# Patient Record
Sex: MALE | Race: OTHER
[De-identification: names, ages, dates, MRNs, and addresses within clinical notes are randomized per-mention and may not be internally consistent; named-entity substitution may affect disease eponyms.]

---

## 2021-07-05 ENCOUNTER — HOSPITAL ENCOUNTER (INPATIENT)
Dept: HOSPITAL 54 - TELE1 | Age: 26
LOS: 4 days | Discharge: HOME | DRG: 137 | End: 2021-07-09
Attending: NURSE PRACTITIONER | Admitting: NURSE PRACTITIONER
Payer: MEDICAID

## 2021-07-05 VITALS — DIASTOLIC BLOOD PRESSURE: 65 MMHG | SYSTOLIC BLOOD PRESSURE: 102 MMHG

## 2021-07-05 VITALS — DIASTOLIC BLOOD PRESSURE: 68 MMHG | SYSTOLIC BLOOD PRESSURE: 109 MMHG

## 2021-07-05 VITALS — DIASTOLIC BLOOD PRESSURE: 65 MMHG | SYSTOLIC BLOOD PRESSURE: 105 MMHG

## 2021-07-05 VITALS — SYSTOLIC BLOOD PRESSURE: 107 MMHG | DIASTOLIC BLOOD PRESSURE: 71 MMHG

## 2021-07-05 VITALS — WEIGHT: 164 LBS | BODY MASS INDEX: 27.32 KG/M2 | HEIGHT: 65 IN

## 2021-07-05 VITALS — SYSTOLIC BLOOD PRESSURE: 106 MMHG | DIASTOLIC BLOOD PRESSURE: 65 MMHG

## 2021-07-05 VITALS — DIASTOLIC BLOOD PRESSURE: 60 MMHG | SYSTOLIC BLOOD PRESSURE: 106 MMHG

## 2021-07-05 DIAGNOSIS — E87.2: ICD-10-CM

## 2021-07-05 DIAGNOSIS — E86.0: ICD-10-CM

## 2021-07-05 DIAGNOSIS — E88.09: ICD-10-CM

## 2021-07-05 DIAGNOSIS — R74.8: ICD-10-CM

## 2021-07-05 DIAGNOSIS — U07.1: Primary | ICD-10-CM

## 2021-07-05 DIAGNOSIS — J12.82: ICD-10-CM
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
## 2021-07-05 DIAGNOSIS — T50.995A: ICD-10-CM

## 2021-07-05 DIAGNOSIS — J15.9: ICD-10-CM

## 2021-07-05 DIAGNOSIS — D72.810: ICD-10-CM

## 2021-07-05 LAB
ALBUMIN SERPL BCP-MCNC: 3.2 G/DL (ref 3.4–5)
ALP SERPL-CCNC: 22 U/L (ref 46–116)
ALT SERPL W P-5'-P-CCNC: 65 U/L (ref 12–78)
AST SERPL W P-5'-P-CCNC: 53 U/L (ref 15–37)
BASE EXCESS BLDA CALC-SCNC: -3.2 MMOL/L
BASOPHILS # BLD AUTO: 0 K/UL (ref 0–0.2)
BASOPHILS NFR BLD AUTO: 0.1 % (ref 0–2)
BILIRUB DIRECT SERPL-MCNC: 0.2 MG/DL (ref 0–0.2)
BILIRUB SERPL-MCNC: 0.3 MG/DL (ref 0.2–1)
BUN SERPL-MCNC: 11 MG/DL (ref 7–18)
CALCIUM SERPL-MCNC: 8.7 MG/DL (ref 8.5–10.1)
CHLORIDE SERPL-SCNC: 101 MMOL/L (ref 98–107)
CO2 SERPL-SCNC: 29 MMOL/L (ref 21–32)
CREAT SERPL-MCNC: 0.8 MG/DL (ref 0.6–1.3)
DO-HGB MFR BLDA: 76.2 MMHG
EOSINOPHIL NFR BLD AUTO: 0 % (ref 0–6)
FERRITIN SERPL-MCNC: 630 NG/ML (ref 8–388)
GLUCOSE SERPL-MCNC: 149 MG/DL (ref 74–106)
HCT VFR BLD AUTO: 42 % (ref 39–51)
HGB BLD-MCNC: 14 G/DL (ref 13.5–17.5)
INHALED O2 CONCENTRATION: 28 %
LYMPHOCYTES NFR BLD AUTO: 0.3 K/UL (ref 0.8–4.8)
LYMPHOCYTES NFR BLD AUTO: 11.7 % (ref 20–44)
MCHC RBC AUTO-ENTMCNC: 34 G/DL (ref 31–36)
MCV RBC AUTO: 91 FL (ref 80–96)
MONOCYTES NFR BLD AUTO: 0.2 K/UL (ref 0.1–1.3)
MONOCYTES NFR BLD AUTO: 5.7 % (ref 2–12)
NEUTROPHILS # BLD AUTO: 2.3 K/UL (ref 1.8–8.9)
NEUTROPHILS NFR BLD AUTO: 82.5 % (ref 43–81)
PCO2 TEMP ADJ BLDA: 27.9 MMHG (ref 35–45)
PH TEMP ADJ BLDA: 7.46 [PH] (ref 7.35–7.45)
PLATELET # BLD AUTO: 191 K/UL (ref 150–450)
PO2 TEMP ADJ BLDA: 90.5 MMHG (ref 75–100)
POTASSIUM SERPL-SCNC: 5 MMOL/L (ref 3.5–5.1)
PROT SERPL-MCNC: 7.9 G/DL (ref 6.4–8.2)
RBC # BLD AUTO: 4.59 MIL/UL (ref 4.5–6)
SAO2 % BLDA: 97.2 % (ref 92–98.5)
SODIUM SERPL-SCNC: 139 MMOL/L (ref 136–145)
WBC NRBC COR # BLD AUTO: 2.8 K/UL (ref 4.3–11)

## 2021-07-05 PROCEDURE — XW033E5 INTRODUCTION OF REMDESIVIR ANTI-INFECTIVE INTO PERIPHERAL VEIN, PERCUTANEOUS APPROACH, NEW TECHNOLOGY GROUP 5: ICD-10-PCS | Performed by: INTERNAL MEDICINE

## 2021-07-05 PROCEDURE — G0378 HOSPITAL OBSERVATION PER HR: HCPCS

## 2021-07-05 RX ADMIN — DEXAMETHASONE SODIUM PHOSPHATE SCH MG: 10 INJECTION INTRAMUSCULAR; INTRAVENOUS at 09:20

## 2021-07-05 RX ADMIN — ENOXAPARIN SODIUM SCH MG: 40 INJECTION SUBCUTANEOUS at 10:10

## 2021-07-05 RX ADMIN — PANTOPRAZOLE SODIUM SCH MG: 40 TABLET, DELAYED RELEASE ORAL at 08:26

## 2021-07-05 RX ADMIN — SODIUM CHLORIDE SCH MLS/HR: 9 INJECTION, SOLUTION INTRAVENOUS at 18:08

## 2021-07-05 NOTE — NUR
RN NOTE



PATIENT IS ALERT AND ORIENTED X4, ABLE TO MAKE NEEDS KNOWN. ON O2 2L VIA NASAL CANNULA, O2 
SAT 98%. NO SIGNS OF RESPIRATORY DISTRESS. DENIES ANY PAIN OR DISCOMFORT AT THIS TIME. TELE 
MONITOR ON, HR 70'S. IV ACCESS ON RIGHT AC #22, PATENT AND INTACT. ON DROPLET ISOLATION DUE 
TO POSITIVE COVID-19, PRECAUTIONS OBSERVED. BED LOCKED AND IN LOWEST POSITION. SAFETY 
MEASURES MAINTAINED. ALL NEEDS ANTICIPATED.

## 2021-07-05 NOTE — NUR
RN NOTE



PATIENT IS ALERT AND ORIENTED X4, ABLE TO MAKE NEEDS KNOWN. ON O2 2L VIA NASAL CANNULA, O2 
SAT 98%. NO SIGNS OF RESPIRATORY DISTRESS. DENIES ANY PAIN OR DISCOMFORT AT THIS TIME. TELE 
MONITOR ON, HR 72. IV ACCESS ON RIGHT AC #22, PATENT AND INTACT. BED LOCKED AND IN LOWEST 
POSITION. SAFETY MEASURES MAINTAINED. WILL ENDORSE TO AM SHIFT.

## 2021-07-05 NOTE — NUR
RN OPENING NOTE



RECEIVED PATIENT ASLEEP IN BED, EASY TO AROUSE. ALERT AND ORIENTED X4. ABLE TO MAKE NEEDS 
KNOWN. NO SIGNS OR SYMPTOMS OF DISTRESS NOTED. DENIES PAIN OR DISCOMFORT AT THIS TIME. ON O2 
2L NASAL CANNULA, TOLERATING WELL. IV ACCESS ON RAC#22, PATENT AND INTACT. SAFETY MEASURES 
IN PLACE WITH BED LOCKED AT LOWEST POSITION, SIDE RAILS UP X2. CALL LIGHT AND TABLE WITHIN 
REACH. WILL CONTINUE TO MONITOR PATIENT THROUGHOUT SHIFT.

## 2021-07-05 NOTE — NUR
ADMIT NOTE



DIRECT ADMIT FROM [facility], VIA GURNEY. PATIENT PLACED IN ROOM 103. PATIENT IN 
DROPLET ISOLATION DUE TO POSITIVE COVID-19 FROM [place]. ISOLATION PRECAUTIONS OBSERVED. 
PATIENT IS ALERT AND ORIENTED X4, ABLE TO MAKE NEEDS KNOWN. ON O2 2L VIA NASAL CANNULA, O2 
SAT 99%. NO SIGNS OF RESPIRATORY DISTRESS. DENIES ANY PAIN OR DISCOMFORT AT THIS TIME. 
PATIENT STATED BODY ACHES RELIEVED FROM TYLENOL TAKEN AT [place]. TELE MONITOR ON, HR 65. IV 
ACCESS ON RIGHT AC #22, PATENT AND INTACT. SKIN ASSESSMENT DONE, SKIN IS INTACT. VITAL SIGNS 
STABLE. ORIENTED PATIENT TO ROOM AND CALL LIGHT. BED LOCKED AND IN LOWEST POSITION. SAFETY 
MEASURES MAINTAINED. ALL NEEDS ANTICIPATED.

## 2021-07-05 NOTE — NUR
NOTIFIED ALISSA JOHNSTON, PATIENT RECEIVED DOSE OF ROCEPHIN AND AZITHROMYCIN @0555 AT [place] LAST 
NIGHT WITH NEW ORDERS. [facility] MADE AWARE.

## 2021-07-06 VITALS — DIASTOLIC BLOOD PRESSURE: 64 MMHG | SYSTOLIC BLOOD PRESSURE: 102 MMHG

## 2021-07-06 VITALS — DIASTOLIC BLOOD PRESSURE: 60 MMHG | SYSTOLIC BLOOD PRESSURE: 93 MMHG

## 2021-07-06 VITALS — SYSTOLIC BLOOD PRESSURE: 97 MMHG | DIASTOLIC BLOOD PRESSURE: 58 MMHG

## 2021-07-06 VITALS — SYSTOLIC BLOOD PRESSURE: 95 MMHG | DIASTOLIC BLOOD PRESSURE: 55 MMHG

## 2021-07-06 VITALS — SYSTOLIC BLOOD PRESSURE: 105 MMHG | DIASTOLIC BLOOD PRESSURE: 44 MMHG

## 2021-07-06 VITALS — DIASTOLIC BLOOD PRESSURE: 62 MMHG | SYSTOLIC BLOOD PRESSURE: 108 MMHG

## 2021-07-06 LAB
ALBUMIN SERPL BCP-MCNC: 3.4 G/DL (ref 3.4–5)
ALP SERPL-CCNC: 26 U/L (ref 46–116)
ALT SERPL W P-5'-P-CCNC: 93 U/L (ref 12–78)
AST SERPL W P-5'-P-CCNC: 54 U/L (ref 15–37)
BASOPHILS # BLD AUTO: 0 K/UL (ref 0–0.2)
BASOPHILS NFR BLD AUTO: 0.1 % (ref 0–2)
BILIRUB DIRECT SERPL-MCNC: 0.1 MG/DL (ref 0–0.2)
BILIRUB SERPL-MCNC: 0.3 MG/DL (ref 0.2–1)
BUN SERPL-MCNC: 13 MG/DL (ref 7–18)
CALCIUM SERPL-MCNC: 8.8 MG/DL (ref 8.5–10.1)
CHLORIDE SERPL-SCNC: 100 MMOL/L (ref 98–107)
CO2 SERPL-SCNC: 27 MMOL/L (ref 21–32)
CREAT SERPL-MCNC: 0.8 MG/DL (ref 0.6–1.3)
CRP SERPL-MCNC: 8.7 MG/DL (ref 0–0.9)
EOSINOPHIL NFR BLD AUTO: 0 % (ref 0–6)
GLUCOSE SERPL-MCNC: 112 MG/DL (ref 74–106)
HCT VFR BLD AUTO: 45 % (ref 39–51)
HGB BLD-MCNC: 14.9 G/DL (ref 13.5–17.5)
LYMPHOCYTES NFR BLD AUTO: 0.5 K/UL (ref 0.8–4.8)
LYMPHOCYTES NFR BLD AUTO: 7.2 % (ref 20–44)
MAGNESIUM SERPL-MCNC: 2.6 MG/DL (ref 1.8–2.4)
MCHC RBC AUTO-ENTMCNC: 33 G/DL (ref 31–36)
MCV RBC AUTO: 92 FL (ref 80–96)
MONOCYTES NFR BLD AUTO: 0.7 K/UL (ref 0.1–1.3)
MONOCYTES NFR BLD AUTO: 9.8 % (ref 2–12)
NEUTROPHILS # BLD AUTO: 6.1 K/UL (ref 1.8–8.9)
NEUTROPHILS NFR BLD AUTO: 82.9 % (ref 43–81)
PHOSPHATE SERPL-MCNC: 4 MG/DL (ref 2.5–4.9)
PLATELET # BLD AUTO: 279 K/UL (ref 150–450)
POTASSIUM SERPL-SCNC: 3.9 MMOL/L (ref 3.5–5.1)
PROT SERPL-MCNC: 8.3 G/DL (ref 6.4–8.2)
RBC # BLD AUTO: 4.91 MIL/UL (ref 4.5–6)
SODIUM SERPL-SCNC: 140 MMOL/L (ref 136–145)
WBC NRBC COR # BLD AUTO: 7.4 K/UL (ref 4.3–11)

## 2021-07-06 RX ADMIN — DEXAMETHASONE SODIUM PHOSPHATE SCH MG: 10 INJECTION INTRAMUSCULAR; INTRAVENOUS at 09:18

## 2021-07-06 RX ADMIN — PANTOPRAZOLE SODIUM SCH MG: 40 TABLET, DELAYED RELEASE ORAL at 09:18

## 2021-07-06 RX ADMIN — DEXTROSE MONOHYDRATE SCH MLS/HR: 50 INJECTION, SOLUTION INTRAVENOUS at 02:54

## 2021-07-06 RX ADMIN — ENOXAPARIN SODIUM SCH MG: 40 INJECTION SUBCUTANEOUS at 09:27

## 2021-07-06 RX ADMIN — SODIUM CHLORIDE SCH MLS/HR: 9 INJECTION, SOLUTION INTRAVENOUS at 17:53

## 2021-07-06 RX ADMIN — DEXTROSE MONOHYDRATE SCH MLS/HR: 50 INJECTION, SOLUTION INTRAVENOUS at 04:05

## 2021-07-06 NOTE — NUR
RN CLOSING NOTES

Patient is alert and oriented. No c/o pain or discomfort. Isolation observed. On 2 liters 02 
via nc with 02 sat of 98%. HOB kept elevated. Call light with in reach. Endorsed to next 
shift for eulalia.

## 2021-07-06 NOTE — NUR
RN  NOTES

Patient is alert and oriented. No c/o pain or discomfort. Isolation observed. On 2 liters 02 
via nc with 02 sat of 100%. HOB kept elevated. Call light with in reach.

## 2021-07-06 NOTE — NUR
RN NOTE



PATIENT IS ALERT AND ORIENTED X4. ON O2 2L VIA NASAL CANNULA, O2 SAT 96%. NO SIGNS OF 
RESPIRATORY DISTRESS. DENIES ANY PAIN OR DISCOMFORT AT THIS TIME. TELE MONITOR ON, HR 70'S. 
IV ACCESS ON RIGHT AC #22, PATENT AND INTACT. ON DROPLET ISOLATION DUE TO POSITIVE COVID-19, 
PRECAUTIONS OBSERVED. ALL DUE MEDS GIVEN AS ORDERED. BED LOCKED AND IN LOWEST POSITION. 
SAFETY MEASURES MAINTAINED. ENDORSED TO AM SHIFT.

## 2021-07-06 NOTE — NUR
RN NOTE



RECEIVED PATIENT IN BED RESTING ALERT ORIENTED X4 ON 2L OXYGEN VIA NASAL CANNULA,O2:97% IV 
SITE IS ON RIGHT AC INTACT PATENT AMBULATORY CONTINENT TO BOWEL/BLADDER SAFETY MEASURE 
IMPLEMENTED CALL LIGHT WITHIN REACH CONTINUE TO MONITOR.

## 2021-07-07 VITALS — SYSTOLIC BLOOD PRESSURE: 105 MMHG | DIASTOLIC BLOOD PRESSURE: 62 MMHG

## 2021-07-07 VITALS — SYSTOLIC BLOOD PRESSURE: 116 MMHG | DIASTOLIC BLOOD PRESSURE: 70 MMHG

## 2021-07-07 VITALS — SYSTOLIC BLOOD PRESSURE: 100 MMHG | DIASTOLIC BLOOD PRESSURE: 67 MMHG

## 2021-07-07 VITALS — SYSTOLIC BLOOD PRESSURE: 96 MMHG | DIASTOLIC BLOOD PRESSURE: 58 MMHG

## 2021-07-07 VITALS — DIASTOLIC BLOOD PRESSURE: 64 MMHG | SYSTOLIC BLOOD PRESSURE: 99 MMHG

## 2021-07-07 VITALS — DIASTOLIC BLOOD PRESSURE: 61 MMHG | SYSTOLIC BLOOD PRESSURE: 95 MMHG

## 2021-07-07 LAB
ALBUMIN SERPL BCP-MCNC: 3 G/DL (ref 3.4–5)
ALP SERPL-CCNC: 20 U/L (ref 46–116)
ALT SERPL W P-5'-P-CCNC: 138 U/L (ref 12–78)
AST SERPL W P-5'-P-CCNC: 55 U/L (ref 15–37)
BASOPHILS # BLD AUTO: 0 K/UL (ref 0–0.2)
BASOPHILS NFR BLD AUTO: 0.1 % (ref 0–2)
BILIRUB DIRECT SERPL-MCNC: 0.1 MG/DL (ref 0–0.2)
BILIRUB SERPL-MCNC: 0.3 MG/DL (ref 0.2–1)
BUN SERPL-MCNC: 12 MG/DL (ref 7–18)
CALCIUM SERPL-MCNC: 8.1 MG/DL (ref 8.5–10.1)
CHLORIDE SERPL-SCNC: 104 MMOL/L (ref 98–107)
CO2 SERPL-SCNC: 24 MMOL/L (ref 21–32)
CREAT SERPL-MCNC: 0.7 MG/DL (ref 0.6–1.3)
EOSINOPHIL NFR BLD AUTO: 0 % (ref 0–6)
GLUCOSE SERPL-MCNC: 108 MG/DL (ref 74–106)
HCT VFR BLD AUTO: 39 % (ref 39–51)
HGB BLD-MCNC: 13.3 G/DL (ref 13.5–17.5)
LYMPHOCYTES NFR BLD AUTO: 1 K/UL (ref 0.8–4.8)
LYMPHOCYTES NFR BLD AUTO: 9.4 % (ref 20–44)
MCHC RBC AUTO-ENTMCNC: 34 G/DL (ref 31–36)
MCV RBC AUTO: 90 FL (ref 80–96)
MONOCYTES NFR BLD AUTO: 0.8 K/UL (ref 0.1–1.3)
MONOCYTES NFR BLD AUTO: 7.8 % (ref 2–12)
NEUTROPHILS # BLD AUTO: 8.6 K/UL (ref 1.8–8.9)
NEUTROPHILS NFR BLD AUTO: 82.7 % (ref 43–81)
PLATELET # BLD AUTO: 311 K/UL (ref 150–450)
POTASSIUM SERPL-SCNC: 3.6 MMOL/L (ref 3.5–5.1)
PROT SERPL-MCNC: 6.9 G/DL (ref 6.4–8.2)
RBC # BLD AUTO: 4.37 MIL/UL (ref 4.5–6)
SODIUM SERPL-SCNC: 139 MMOL/L (ref 136–145)
WBC NRBC COR # BLD AUTO: 10.5 K/UL (ref 4.3–11)

## 2021-07-07 RX ADMIN — DEXTROSE MONOHYDRATE SCH MLS/HR: 50 INJECTION, SOLUTION INTRAVENOUS at 02:52

## 2021-07-07 RX ADMIN — ENOXAPARIN SODIUM SCH MG: 40 INJECTION SUBCUTANEOUS at 11:18

## 2021-07-07 RX ADMIN — DEXTROSE MONOHYDRATE SCH MLS/HR: 50 INJECTION, SOLUTION INTRAVENOUS at 03:38

## 2021-07-07 RX ADMIN — PANTOPRAZOLE SODIUM SCH MG: 40 TABLET, DELAYED RELEASE ORAL at 10:56

## 2021-07-07 RX ADMIN — DEXAMETHASONE SODIUM PHOSPHATE SCH MG: 10 INJECTION INTRAMUSCULAR; INTRAVENOUS at 10:56

## 2021-07-07 RX ADMIN — SODIUM CHLORIDE SCH MLS/HR: 9 INJECTION, SOLUTION INTRAVENOUS at 17:55

## 2021-07-07 NOTE — NUR
RN OPENING NOTES

Patient is alert and oriented. No c/o pain or discomfort. Isolation observed. On 2 liters 02 
via nc with 02 sat of 98%. HOB kept elevated. Call light with in reach. Will continue to 
monitor. Call light with in reach.

## 2021-07-07 NOTE — NUR
RN NOTE



PATIENT REMAINS ON ALERT ORIENTED X4 VERBALLY RESPONSIVE NO SOB NOT ACUTE DISTRESS NOTED ALL 
DUE MEDS GIVEN AS MD ORDERED ENDORSE NEXT SHIFT FOR CONTINUATION OF CARE.

## 2021-07-07 NOTE — NUR
RN OPENING NOTE



RECEIVED PATIENT AWAKE IN BED, EASY TO AROUSE. ALERT AND ORIENTED X4. ABLE TO MAKE NEEDS 
KNOWN. NO SIGNS OR SYMPTOMS OF DISTRESS NOTED. DENIES PAIN OR DISCOMFORT AT THIS TIME. ON O2 
1L NASAL CANNULA, TOLERATING WELL. IV ACCESS ON (R) AC#22, PATENT AND INTACT. SAFETY 
MEASURES IN PLACE WITH BED LOCKED AT LOWEST POSITION, SIDE RAILS UP X2. CALL LIGHT AND TABLE 
WITHIN REACH. MEDICATION REGIMEN AND MD ORDERS WILL BE FOLLOWED AS ORDERED.

## 2021-07-07 NOTE — NUR
RN NOTE



PATIENT COMPLINES OF RIGHT ARM PAIN UPON ASSESSMENT IV SITE IS INFILTRATED REMOVED IT,COOL 
MEASURE PROVIDED, AND STARTED A NEW IV LINE ON LEFT FOREARM #22 WITH GOOD BLOOD RETURN 
CONTINUE TO MONITOR

## 2021-07-07 NOTE — NUR
RN CLOSING NOTES

Patient is alert and oriented. No c/o pain or discomfort. Isolation observed. On 2 liters 02 
via nc with 02 sat of 96%. HOB kept elevated. Call light with in reach. Will continue to 
monitor. Call light with in reach.Endorsed to next shift for KIMBERLEE

## 2021-07-08 VITALS — SYSTOLIC BLOOD PRESSURE: 107 MMHG | DIASTOLIC BLOOD PRESSURE: 63 MMHG

## 2021-07-08 VITALS — DIASTOLIC BLOOD PRESSURE: 69 MMHG | SYSTOLIC BLOOD PRESSURE: 101 MMHG

## 2021-07-08 VITALS — DIASTOLIC BLOOD PRESSURE: 68 MMHG | SYSTOLIC BLOOD PRESSURE: 101 MMHG

## 2021-07-08 LAB
ALBUMIN SERPL BCP-MCNC: 3 G/DL (ref 3.4–5)
ALBUMIN SERPL BCP-MCNC: 3.3 G/DL (ref 3.4–5)
ALP SERPL-CCNC: 25 U/L (ref 46–116)
ALP SERPL-CCNC: 35 U/L (ref 46–116)
ALT SERPL W P-5'-P-CCNC: 363 U/L (ref 12–78)
ALT SERPL W P-5'-P-CCNC: 457 U/L (ref 12–78)
AST SERPL W P-5'-P-CCNC: 149 U/L (ref 15–37)
AST SERPL W P-5'-P-CCNC: 92 U/L (ref 15–37)
BASOPHILS # BLD AUTO: 0 K/UL (ref 0–0.2)
BASOPHILS NFR BLD AUTO: 0.1 % (ref 0–2)
BILIRUB DIRECT SERPL-MCNC: 0.1 MG/DL (ref 0–0.2)
BILIRUB DIRECT SERPL-MCNC: 0.2 MG/DL (ref 0–0.2)
BILIRUB SERPL-MCNC: 0.2 MG/DL (ref 0.2–1)
BILIRUB SERPL-MCNC: 0.3 MG/DL (ref 0.2–1)
BUN SERPL-MCNC: 12 MG/DL (ref 7–18)
CALCIUM SERPL-MCNC: 8.6 MG/DL (ref 8.5–10.1)
CHLORIDE SERPL-SCNC: 103 MMOL/L (ref 98–107)
CO2 SERPL-SCNC: 26 MMOL/L (ref 21–32)
CREAT SERPL-MCNC: 0.7 MG/DL (ref 0.6–1.3)
EOSINOPHIL NFR BLD AUTO: 0.2 % (ref 0–6)
GLUCOSE SERPL-MCNC: 90 MG/DL (ref 74–106)
HCT VFR BLD AUTO: 42 % (ref 39–51)
HGB BLD-MCNC: 14.1 G/DL (ref 13.5–17.5)
LYMPHOCYTES NFR BLD AUTO: 1.3 K/UL (ref 0.8–4.8)
LYMPHOCYTES NFR BLD AUTO: 13 % (ref 20–44)
MCHC RBC AUTO-ENTMCNC: 34 G/DL (ref 31–36)
MCV RBC AUTO: 90 FL (ref 80–96)
MONOCYTES NFR BLD AUTO: 1 K/UL (ref 0.1–1.3)
MONOCYTES NFR BLD AUTO: 9.6 % (ref 2–12)
NEUTROPHILS # BLD AUTO: 8 K/UL (ref 1.8–8.9)
NEUTROPHILS NFR BLD AUTO: 77.1 % (ref 43–81)
PLATELET # BLD AUTO: 393 K/UL (ref 150–450)
POTASSIUM SERPL-SCNC: 4 MMOL/L (ref 3.5–5.1)
PROT SERPL-MCNC: 7.3 G/DL (ref 6.4–8.2)
PROT SERPL-MCNC: 8.1 G/DL (ref 6.4–8.2)
RBC # BLD AUTO: 4.63 MIL/UL (ref 4.5–6)
SODIUM SERPL-SCNC: 138 MMOL/L (ref 136–145)
WBC NRBC COR # BLD AUTO: 10.4 K/UL (ref 4.3–11)

## 2021-07-08 RX ADMIN — DEXTROSE MONOHYDRATE SCH MLS/HR: 50 INJECTION, SOLUTION INTRAVENOUS at 02:34

## 2021-07-08 RX ADMIN — PANTOPRAZOLE SODIUM SCH MG: 40 TABLET, DELAYED RELEASE ORAL at 08:19

## 2021-07-08 RX ADMIN — DEXTROSE MONOHYDRATE SCH MLS/HR: 50 INJECTION, SOLUTION INTRAVENOUS at 03:40

## 2021-07-08 RX ADMIN — DEXAMETHASONE SODIUM PHOSPHATE SCH MG: 10 INJECTION INTRAMUSCULAR; INTRAVENOUS at 08:20

## 2021-07-08 RX ADMIN — ENOXAPARIN SODIUM SCH MG: 40 INJECTION SUBCUTANEOUS at 08:20

## 2021-07-08 NOTE — NUR
RN CLOSING NOTES 



PATIENT CURRENTLY STABLE RESTING IN BED. ALERT AND ORIENTED X4. ABLE TO MAKE NEEDS KNOWN 
THROUGHOUT SHIFT. AMBULATORY WITH STEADY GAIT OBSERVED.  DENIES ANY PAIN. ON 1L 02 VIA NC, 
TOLERATING WELL WITH O2 SATURATIONS ABOVE 98%. NO SOB OR LABORED BREATHING NOTED. NO FEVER 
OR S/S OF INFECTION. ISOLATION PRECAUTIONS FOR COVID-19 OBSERVED. (L) FA 20G IV FLUSHED, 
PATENT AND INTACT.  IV ABX MEDICATION REGIMEN FOLLOWED AS ORDERED AND TOLERATED WELL. PT. 
WAS ABLE TO REST THROUGHOUT MOST OF NIGHT. SAFETY MEASURES IMPLEMENTED. CALL LIGHT WITHIN 
REACH. BED LOWEST POSITION. NO ACUTE DISTRESS NOTED AT THIS TIME. WILL ENDORSE CONTINUITY OF 
CARE TO MORNING SHIFT RN.

## 2021-07-08 NOTE — NUR
RN CLOSING NOTES 



PATIENT RESTING IN BED. ALERT AND ORIENTED X4. ABLE TO MAKE NEEDS KNOWN DENIES ANY PAIN. ON 
ON RA AT THIS TIME TOLERATING WELL WIT NO SOB OR LABORED BREATHING NOTED. ISOLATION 
PRECAUTIONS FOR COVID-19 OBSERVED. (L) FA 20G IV FLUSHED, PATENT AND INTACT AND PATENT  
SAFETY MEASURES IMPLEMENTED. CALL LIGHT WITHIN REACH ABLE TO EAT BREAKFAST SELF , WILL 
MONITOR

## 2021-07-08 NOTE — NUR
MS RN OPENING NOTES

Patient is awake, A&Ox4. Able to verbalize needs. Patient reports he has no SOB O2 sat 94% 
on RA. No respiratory distress seen either. IV to LFA is patent and intact. VSS. Will 
continue to monitor.

## 2021-07-08 NOTE — NUR
MS RN NOTE 

 ATE 25% OF FOOD , ALL NEEDS ATTENDED, CALL LIGHT WITHIN REACH , WILL CONT TO MONITOR, NO 
SOB NOTED AT THIS TIME ,SAFETY MEASURE PROVIDED

## 2021-07-09 VITALS — SYSTOLIC BLOOD PRESSURE: 109 MMHG | DIASTOLIC BLOOD PRESSURE: 61 MMHG

## 2021-07-09 VITALS — SYSTOLIC BLOOD PRESSURE: 110 MMHG | DIASTOLIC BLOOD PRESSURE: 68 MMHG

## 2021-07-09 VITALS — SYSTOLIC BLOOD PRESSURE: 108 MMHG | DIASTOLIC BLOOD PRESSURE: 64 MMHG

## 2021-07-09 LAB
ALBUMIN SERPL BCP-MCNC: 3.5 G/DL (ref 3.4–5)
ALP SERPL-CCNC: 31 U/L (ref 46–116)
ALT SERPL W P-5'-P-CCNC: 362 U/L (ref 12–78)
AST SERPL W P-5'-P-CCNC: 62 U/L (ref 15–37)
BASOPHILS # BLD AUTO: 0 K/UL (ref 0–0.2)
BASOPHILS NFR BLD AUTO: 0.1 % (ref 0–2)
BILIRUB DIRECT SERPL-MCNC: 0.1 MG/DL (ref 0–0.2)
BILIRUB SERPL-MCNC: 0.4 MG/DL (ref 0.2–1)
BUN SERPL-MCNC: 14 MG/DL (ref 7–18)
CALCIUM SERPL-MCNC: 8.9 MG/DL (ref 8.5–10.1)
CHLORIDE SERPL-SCNC: 103 MMOL/L (ref 98–107)
CO2 SERPL-SCNC: 27 MMOL/L (ref 21–32)
CREAT SERPL-MCNC: 0.9 MG/DL (ref 0.6–1.3)
EOSINOPHIL NFR BLD AUTO: 0.3 % (ref 0–6)
GLUCOSE SERPL-MCNC: 88 MG/DL (ref 74–106)
HCT VFR BLD AUTO: 47 % (ref 39–51)
HGB BLD-MCNC: 15.7 G/DL (ref 13.5–17.5)
LYMPHOCYTES NFR BLD AUTO: 1.9 K/UL (ref 0.8–4.8)
LYMPHOCYTES NFR BLD AUTO: 20.1 % (ref 20–44)
MCHC RBC AUTO-ENTMCNC: 34 G/DL (ref 31–36)
MCV RBC AUTO: 90 FL (ref 80–96)
MONOCYTES NFR BLD AUTO: 1 K/UL (ref 0.1–1.3)
MONOCYTES NFR BLD AUTO: 10.1 % (ref 2–12)
NEUTROPHILS # BLD AUTO: 6.5 K/UL (ref 1.8–8.9)
NEUTROPHILS NFR BLD AUTO: 69.4 % (ref 43–81)
PLATELET # BLD AUTO: 523 K/UL (ref 150–450)
POTASSIUM SERPL-SCNC: 3.9 MMOL/L (ref 3.5–5.1)
PROT SERPL-MCNC: 8.1 G/DL (ref 6.4–8.2)
RBC # BLD AUTO: 5.18 MIL/UL (ref 4.5–6)
SODIUM SERPL-SCNC: 140 MMOL/L (ref 136–145)
WBC NRBC COR # BLD AUTO: 9.4 K/UL (ref 4.3–11)

## 2021-07-09 RX ADMIN — ENOXAPARIN SODIUM SCH MG: 40 INJECTION SUBCUTANEOUS at 08:21

## 2021-07-09 RX ADMIN — DEXAMETHASONE SODIUM PHOSPHATE SCH MG: 10 INJECTION INTRAMUSCULAR; INTRAVENOUS at 08:25

## 2021-07-09 RX ADMIN — PANTOPRAZOLE SODIUM SCH MG: 40 TABLET, DELAYED RELEASE ORAL at 07:52

## 2021-07-09 RX ADMIN — PANTOPRAZOLE SODIUM SCH MG: 40 TABLET, DELAYED RELEASE ORAL at 08:09

## 2021-07-09 NOTE — NUR
MS RN CLOSING NOTES

Patient has been A&Ox4. VSS. Denies SOB, no signs of distress. Patient states he feels much 
much better than a few days ago. Drinking fluids well. Independent with self care. States 
most covid symptoms are resolved but feels a little fatigued. IV to LFA intact, patent, and 
flushed. No overnight events.

## 2021-07-09 NOTE — NUR
RN NOTES

RECIEV PT AWAKE AND A AND O X 4,  NO  SOB, NO DISTRESS NOTED,  PT STATING READY TO GO HOME, 
REQUESTED OR APPROVAL FROM MD TO GO HOME TODAY, AUTHORIZED AND APPROVED MEDICATION ORDER 
GIVEN TO PT TO TAKE TO RECEIVE MEDICATIONS AND GIVE TO SELF AT HOME, UNDERSTOOD THE ORDERS 
AND HOW TO TAKE, ABLE TO SPEAK BACK DIRECTIONS AND HAD CLEAR UNDERSTANDING WANTED TO 
AMBULATE ON OWN TO RIDE NAMED GRAZYNA Felix SHOW WAS AWAITING OUT FRONT OF HOSPITAL TO 
 PT, SAFE TRANSFER FROM BED TO AMBULATE OUTSIDE TO FRONT DOOR OF PARKING LOT GOT INTO 
CARE ON OWN ALL BELONGINGS TAKEN WITH PT AND ALL DOCUMENTS SIGNED OF D/C. THE IV SITE D/C 
AND ALL SAFETY MEASURES IN PLACE, SAFE TRANSFER ENCOURAGED TO DRINK PLENTY OF WATER AND 
FLUIDS AND TO TRY TO INCREASE AMOUNT OF SLEEP AND REST AND TO FOLLOW UP WITH MD IF ANY 
CHANGES IN CONDITION ARE NOTED.